# Patient Record
Sex: MALE | Race: WHITE | Employment: OTHER | URBAN - METROPOLITAN AREA
[De-identification: names, ages, dates, MRNs, and addresses within clinical notes are randomized per-mention and may not be internally consistent; named-entity substitution may affect disease eponyms.]

---

## 2017-02-16 PROBLEM — E11.8 CONTROLLED TYPE 2 DIABETES MELLITUS WITH COMPLICATION, WITHOUT LONG-TERM CURRENT USE OF INSULIN (HCC): Status: ACTIVE | Noted: 2017-02-16

## 2017-07-06 PROCEDURE — 82570 ASSAY OF URINE CREATININE: CPT | Performed by: INTERNAL MEDICINE

## 2017-07-06 PROCEDURE — 82043 UR ALBUMIN QUANTITATIVE: CPT | Performed by: INTERNAL MEDICINE

## 2018-01-01 PROCEDURE — 82043 UR ALBUMIN QUANTITATIVE: CPT | Performed by: INTERNAL MEDICINE

## 2018-01-01 PROCEDURE — 82570 ASSAY OF URINE CREATININE: CPT | Performed by: INTERNAL MEDICINE

## 2018-04-30 PROBLEM — F33.42 RECURRENT MAJOR DEPRESSIVE DISORDER, IN FULL REMISSION (HCC): Status: ACTIVE | Noted: 2018-01-01

## 2018-12-10 PROBLEM — I48.19 PERSISTENT ATRIAL FIBRILLATION (HCC): Status: ACTIVE | Noted: 2018-01-01

## 2019-01-01 ENCOUNTER — HOSPITAL ENCOUNTER (INPATIENT)
Facility: HOSPITAL | Age: 84
LOS: 4 days | Discharge: NURSING, REHAB OR LONG-TERM CARE | DRG: 689 | End: 2019-01-01
Attending: EMERGENCY MEDICINE | Admitting: HOSPITALIST
Payer: MEDICARE

## 2019-01-01 ENCOUNTER — APPOINTMENT (OUTPATIENT)
Dept: GENERAL RADIOLOGY | Facility: HOSPITAL | Age: 84
DRG: 689 | End: 2019-01-01
Attending: EMERGENCY MEDICINE
Payer: MEDICARE

## 2019-01-01 ENCOUNTER — HOSPITAL ENCOUNTER (EMERGENCY)
Facility: HOSPITAL | Age: 84
Discharge: HOME | End: 2019-01-01
Attending: EMERGENCY MEDICINE
Payer: MEDICARE

## 2019-01-01 VITALS
WEIGHT: 149.94 LBS | DIASTOLIC BLOOD PRESSURE: 72 MMHG | RESPIRATION RATE: 18 BRPM | TEMPERATURE: 98 F | SYSTOLIC BLOOD PRESSURE: 135 MMHG | OXYGEN SATURATION: 94 % | HEART RATE: 80 BPM | BODY MASS INDEX: 20 KG/M2

## 2019-01-01 VITALS
DIASTOLIC BLOOD PRESSURE: 91 MMHG | BODY MASS INDEX: 20.32 KG/M2 | HEART RATE: 74 BPM | OXYGEN SATURATION: 96 % | SYSTOLIC BLOOD PRESSURE: 159 MMHG | RESPIRATION RATE: 18 BRPM | TEMPERATURE: 98 F | HEIGHT: 72 IN | WEIGHT: 150 LBS

## 2019-01-01 DIAGNOSIS — R19.5 HEME POSITIVE STOOL: ICD-10-CM

## 2019-01-01 DIAGNOSIS — R53.1 WEAKNESS: Primary | ICD-10-CM

## 2019-01-01 DIAGNOSIS — R31.9 HEMATURIA, UNSPECIFIED TYPE: Primary | ICD-10-CM

## 2019-01-01 DIAGNOSIS — M48.062 SPINAL STENOSIS OF LUMBAR REGION WITH NEUROGENIC CLAUDICATION: ICD-10-CM

## 2019-01-01 DIAGNOSIS — N39.0 URINARY TRACT INFECTION WITHOUT HEMATURIA, SITE UNSPECIFIED: ICD-10-CM

## 2019-01-01 LAB
ANION GAP SERPL CALC-SCNC: 10 MMOL/L (ref 0–18)
ANION GAP SERPL CALC-SCNC: 11 MMOL/L (ref 0–18)
ANION GAP SERPL CALC-SCNC: 12 MMOL/L (ref 0–18)
ANION GAP SERPL CALC-SCNC: 9 MMOL/L (ref 0–18)
ANION GAP SERPL CALC-SCNC: 9 MMOL/L (ref 0–18)
BASOPHILS # BLD: 0 K/UL (ref 0–0.2)
BASOPHILS # BLD: 0.1 K/UL (ref 0–0.2)
BASOPHILS NFR BLD: 0 %
BASOPHILS NFR BLD: 1 %
BASOPHILS NFR BLD: 1 %
BILIRUB UR QL: NEGATIVE
BILIRUB UR QL: NEGATIVE
BUN SERPL-MCNC: 11 MG/DL (ref 8–20)
BUN SERPL-MCNC: 15 MG/DL (ref 8–20)
BUN SERPL-MCNC: 18 MG/DL (ref 8–20)
BUN SERPL-MCNC: 18 MG/DL (ref 8–20)
BUN SERPL-MCNC: 9 MG/DL (ref 8–20)
BUN/CREAT SERPL: 11 (ref 10–20)
BUN/CREAT SERPL: 12.9 (ref 10–20)
BUN/CREAT SERPL: 18.3 (ref 10–20)
BUN/CREAT SERPL: 19.6 (ref 10–20)
BUN/CREAT SERPL: 20.5 (ref 10–20)
CALCIUM SERPL-MCNC: 10 MG/DL (ref 8.5–10.5)
CALCIUM SERPL-MCNC: 10.1 MG/DL (ref 8.5–10.5)
CALCIUM SERPL-MCNC: 10.3 MG/DL (ref 8.5–10.5)
CALCIUM SERPL-MCNC: 9.6 MG/DL (ref 8.5–10.5)
CALCIUM SERPL-MCNC: 9.8 MG/DL (ref 8.5–10.5)
CHLORIDE SERPL-SCNC: 102 MMOL/L (ref 95–110)
CHLORIDE SERPL-SCNC: 102 MMOL/L (ref 95–110)
CHLORIDE SERPL-SCNC: 103 MMOL/L (ref 95–110)
CHLORIDE SERPL-SCNC: 103 MMOL/L (ref 95–110)
CHLORIDE SERPL-SCNC: 97 MMOL/L (ref 95–110)
CO2 SERPL-SCNC: 23 MMOL/L (ref 22–32)
CO2 SERPL-SCNC: 24 MMOL/L (ref 22–32)
CO2 SERPL-SCNC: 25 MMOL/L (ref 22–32)
COLOR UR: YELLOW
CREAT SERPL-MCNC: 0.82 MG/DL (ref 0.5–1.5)
CREAT SERPL-MCNC: 0.82 MG/DL (ref 0.5–1.5)
CREAT SERPL-MCNC: 0.85 MG/DL (ref 0.5–1.5)
CREAT SERPL-MCNC: 0.88 MG/DL (ref 0.5–1.5)
CREAT SERPL-MCNC: 0.92 MG/DL (ref 0.5–1.5)
EOSINOPHIL # BLD: 0 K/UL (ref 0–0.7)
EOSINOPHIL # BLD: 0.1 K/UL (ref 0–0.7)
EOSINOPHIL # BLD: 0.1 K/UL (ref 0–0.7)
EOSINOPHIL NFR BLD: 0 %
EOSINOPHIL NFR BLD: 1 %
EOSINOPHIL NFR BLD: 1 %
ERYTHROCYTE [DISTWIDTH] IN BLOOD BY AUTOMATED COUNT: 16.6 % (ref 11–15)
ERYTHROCYTE [DISTWIDTH] IN BLOOD BY AUTOMATED COUNT: 17 % (ref 11–15)
ERYTHROCYTE [DISTWIDTH] IN BLOOD BY AUTOMATED COUNT: 17 % (ref 11–15)
ERYTHROCYTE [DISTWIDTH] IN BLOOD BY AUTOMATED COUNT: 17.1 % (ref 11–15)
ERYTHROCYTE [DISTWIDTH] IN BLOOD BY AUTOMATED COUNT: 17.4 % (ref 11–15)
GLUCOSE BLDC GLUCOMTR-MCNC: 118 MG/DL (ref 70–99)
GLUCOSE SERPL-MCNC: 110 MG/DL (ref 70–99)
GLUCOSE SERPL-MCNC: 113 MG/DL (ref 70–99)
GLUCOSE SERPL-MCNC: 118 MG/DL (ref 70–99)
GLUCOSE SERPL-MCNC: 121 MG/DL (ref 70–99)
GLUCOSE SERPL-MCNC: 124 MG/DL (ref 70–99)
GLUCOSE UR-MCNC: NEGATIVE MG/DL
GLUCOSE UR-MCNC: NEGATIVE MG/DL
HCT VFR BLD AUTO: 28.7 % (ref 41–52)
HCT VFR BLD AUTO: 29.9 % (ref 41–52)
HCT VFR BLD AUTO: 30.9 % (ref 41–52)
HCT VFR BLD AUTO: 31.7 % (ref 41–52)
HCT VFR BLD AUTO: 32.8 % (ref 41–52)
HGB BLD-MCNC: 10.3 G/DL (ref 13.5–17.5)
HGB BLD-MCNC: 9.1 G/DL (ref 13.5–17.5)
HGB BLD-MCNC: 9.2 G/DL (ref 13.5–17.5)
HGB BLD-MCNC: 9.7 G/DL (ref 13.5–17.5)
HGB BLD-MCNC: 9.8 G/DL (ref 13.5–17.5)
KETONES UR-MCNC: NEGATIVE MG/DL
KETONES UR-MCNC: NEGATIVE MG/DL
LEUKOCYTE ESTERASE UR QL STRIP.AUTO: NEGATIVE
LYMPHOCYTES # BLD: 0.6 K/UL (ref 1–4)
LYMPHOCYTES # BLD: 0.7 K/UL (ref 1–4)
LYMPHOCYTES # BLD: 0.7 K/UL (ref 1–4)
LYMPHOCYTES # BLD: 0.8 K/UL (ref 1–4)
LYMPHOCYTES # BLD: 1.1 K/UL (ref 1–4)
LYMPHOCYTES NFR BLD: 12 %
LYMPHOCYTES NFR BLD: 5 %
LYMPHOCYTES NFR BLD: 5 %
LYMPHOCYTES NFR BLD: 8 %
LYMPHOCYTES NFR BLD: 8 %
MAGNESIUM SERPL-MCNC: 1.9 MG/DL (ref 1.8–2.5)
MCH RBC QN AUTO: 24.9 PG (ref 27–32)
MCH RBC QN AUTO: 25.2 PG (ref 27–32)
MCH RBC QN AUTO: 25.3 PG (ref 27–32)
MCH RBC QN AUTO: 25.4 PG (ref 27–32)
MCH RBC QN AUTO: 25.4 PG (ref 27–32)
MCHC RBC AUTO-ENTMCNC: 30.7 G/DL (ref 32–37)
MCHC RBC AUTO-ENTMCNC: 31 G/DL (ref 32–37)
MCHC RBC AUTO-ENTMCNC: 31.3 G/DL (ref 32–37)
MCHC RBC AUTO-ENTMCNC: 31.5 G/DL (ref 32–37)
MCHC RBC AUTO-ENTMCNC: 31.6 G/DL (ref 32–37)
MCV RBC AUTO: 80.4 FL (ref 80–100)
MCV RBC AUTO: 80.5 FL (ref 80–100)
MCV RBC AUTO: 80.6 FL (ref 80–100)
MCV RBC AUTO: 81 FL (ref 80–100)
MCV RBC AUTO: 81.7 FL (ref 80–100)
MONOCYTES # BLD: 0.8 K/UL (ref 0–1)
MONOCYTES # BLD: 0.9 K/UL (ref 0–1)
MONOCYTES # BLD: 0.9 K/UL (ref 0–1)
MONOCYTES # BLD: 1 K/UL (ref 0–1)
MONOCYTES # BLD: 1.1 K/UL (ref 0–1)
MONOCYTES NFR BLD: 10 %
MONOCYTES NFR BLD: 10 %
MONOCYTES NFR BLD: 6 %
MONOCYTES NFR BLD: 7 %
MONOCYTES NFR BLD: 9 %
NEUTROPHILS # BLD AUTO: 10.3 K/UL (ref 1.8–7.7)
NEUTROPHILS # BLD AUTO: 13 K/UL (ref 1.8–7.7)
NEUTROPHILS # BLD AUTO: 7 K/UL (ref 1.8–7.7)
NEUTROPHILS # BLD AUTO: 7.6 K/UL (ref 1.8–7.7)
NEUTROPHILS # BLD AUTO: 8.8 K/UL (ref 1.8–7.7)
NEUTROPHILS NFR BLD: 77 %
NEUTROPHILS NFR BLD: 81 %
NEUTROPHILS NFR BLD: 81 %
NEUTROPHILS NFR BLD: 87 %
NEUTROPHILS NFR BLD: 89 %
NITRITE UR QL STRIP.AUTO: NEGATIVE
NITRITE UR QL STRIP.AUTO: NEGATIVE
OSMOLALITY UR CALC.SUM OF ELEC: 278 MOSM/KG (ref 275–295)
OSMOLALITY UR CALC.SUM OF ELEC: 278 MOSM/KG (ref 275–295)
OSMOLALITY UR CALC.SUM OF ELEC: 282 MOSM/KG (ref 275–295)
OSMOLALITY UR CALC.SUM OF ELEC: 283 MOSM/KG (ref 275–295)
OSMOLALITY UR CALC.SUM OF ELEC: 289 MOSM/KG (ref 275–295)
PH UR: 5 [PH] (ref 5–8)
PH UR: 5 [PH] (ref 5–8)
PLATELET # BLD AUTO: 164 K/UL (ref 140–400)
PLATELET # BLD AUTO: 166 K/UL (ref 140–400)
PLATELET # BLD AUTO: 177 K/UL (ref 140–400)
PLATELET # BLD AUTO: 198 K/UL (ref 140–400)
PLATELET # BLD AUTO: 213 K/UL (ref 140–400)
PMV BLD AUTO: 7.7 FL (ref 7.4–10.3)
PMV BLD AUTO: 8 FL (ref 7.4–10.3)
PMV BLD AUTO: 8.4 FL (ref 7.4–10.3)
PMV BLD AUTO: 8.8 FL (ref 7.4–10.3)
PMV BLD AUTO: 9 FL (ref 7.4–10.3)
POTASSIUM SERPL-SCNC: 3.8 MMOL/L (ref 3.3–5.1)
POTASSIUM SERPL-SCNC: 3.8 MMOL/L (ref 3.3–5.1)
POTASSIUM SERPL-SCNC: 3.9 MMOL/L (ref 3.3–5.1)
POTASSIUM SERPL-SCNC: 4 MMOL/L (ref 3.3–5.1)
POTASSIUM SERPL-SCNC: 4.2 MMOL/L (ref 3.3–5.1)
POTASSIUM SERPL-SCNC: 4.2 MMOL/L (ref 3.3–5.1)
PROT UR-MCNC: 100 MG/DL
PROT UR-MCNC: 100 MG/DL
RBC # BLD AUTO: 3.56 M/UL (ref 4.5–5.9)
RBC # BLD AUTO: 3.66 M/UL (ref 4.5–5.9)
RBC # BLD AUTO: 3.85 M/UL (ref 4.5–5.9)
RBC # BLD AUTO: 3.91 M/UL (ref 4.5–5.9)
RBC # BLD AUTO: 4.07 M/UL (ref 4.5–5.9)
RBC #/AREA URNS AUTO: 490 /HPF
RBC #/AREA URNS AUTO: 6300 /HPF
SODIUM SERPL-SCNC: 133 MMOL/L (ref 136–144)
SODIUM SERPL-SCNC: 134 MMOL/L (ref 136–144)
SODIUM SERPL-SCNC: 135 MMOL/L (ref 136–144)
SODIUM SERPL-SCNC: 136 MMOL/L (ref 136–144)
SODIUM SERPL-SCNC: 138 MMOL/L (ref 136–144)
SP GR UR STRIP: 1.02 (ref 1–1.03)
SP GR UR STRIP: 1.02 (ref 1–1.03)
UROBILINOGEN UR STRIP-ACNC: <2
UROBILINOGEN UR STRIP-ACNC: <2
VIT C UR-MCNC: 20 MG/DL
VIT C UR-MCNC: NEGATIVE MG/DL
WBC # BLD AUTO: 10.9 K/UL (ref 4–11)
WBC # BLD AUTO: 11.8 K/UL (ref 4–11)
WBC # BLD AUTO: 14.6 K/UL (ref 4–11)
WBC # BLD AUTO: 9.1 K/UL (ref 4–11)
WBC # BLD AUTO: 9.5 K/UL (ref 4–11)
WBC #/AREA URNS AUTO: 1981 /HPF
WBC #/AREA URNS AUTO: 5 /HPF

## 2019-01-01 PROCEDURE — 99231 SBSQ HOSP IP/OBS SF/LOW 25: CPT | Performed by: REGISTERED NURSE

## 2019-01-01 PROCEDURE — 99232 SBSQ HOSP IP/OBS MODERATE 35: CPT | Performed by: REGISTERED NURSE

## 2019-01-01 PROCEDURE — 93010 ELECTROCARDIOGRAM REPORT: CPT | Performed by: EMERGENCY MEDICINE

## 2019-01-01 PROCEDURE — 99284 EMERGENCY DEPT VISIT MOD MDM: CPT

## 2019-01-01 PROCEDURE — 81001 URINALYSIS AUTO W/SCOPE: CPT | Performed by: EMERGENCY MEDICINE

## 2019-01-01 PROCEDURE — 94640 AIRWAY INHALATION TREATMENT: CPT

## 2019-01-01 PROCEDURE — 71045 X-RAY EXAM CHEST 1 VIEW: CPT | Performed by: EMERGENCY MEDICINE

## 2019-01-01 PROCEDURE — 99223 1ST HOSP IP/OBS HIGH 75: CPT | Performed by: REGISTERED NURSE

## 2019-01-01 PROCEDURE — 93005 ELECTROCARDIOGRAM TRACING: CPT

## 2019-01-01 RX ORDER — ONDANSETRON 2 MG/ML
4 INJECTION INTRAMUSCULAR; INTRAVENOUS EVERY 6 HOURS PRN
Status: DISCONTINUED | OUTPATIENT
Start: 2019-01-01 | End: 2019-01-01

## 2019-01-01 RX ORDER — HALOPERIDOL 5 MG/ML
0.5 INJECTION INTRAMUSCULAR EVERY 6 HOURS PRN
Status: DISCONTINUED | OUTPATIENT
Start: 2019-01-01 | End: 2019-01-01

## 2019-01-01 RX ORDER — METOCLOPRAMIDE HYDROCHLORIDE 5 MG/ML
10 INJECTION INTRAMUSCULAR; INTRAVENOUS EVERY 8 HOURS PRN
Status: DISCONTINUED | OUTPATIENT
Start: 2019-01-01 | End: 2019-01-01

## 2019-01-01 RX ORDER — FENTANYL 12 UG/H
1 PATCH TRANSDERMAL
Qty: 1 PATCH | Refills: 0 | Status: SHIPPED | OUTPATIENT
Start: 2019-01-01 | End: 2019-01-01

## 2019-01-01 RX ORDER — CEFUROXIME AXETIL 500 MG/1
500 TABLET ORAL 2 TIMES DAILY
Qty: 28 TABLET | Refills: 0 | Status: SHIPPED | OUTPATIENT
Start: 2019-01-01 | End: 2019-01-01

## 2019-01-01 RX ORDER — LORAZEPAM 1 MG/1
1 TABLET ORAL 2 TIMES DAILY PRN
Status: DISCONTINUED | OUTPATIENT
Start: 2019-01-01 | End: 2019-01-01

## 2019-01-01 RX ORDER — SODIUM CHLORIDE 0.9 % (FLUSH) 0.9 %
3 SYRINGE (ML) INJECTION AS NEEDED
Status: DISCONTINUED | OUTPATIENT
Start: 2019-01-01 | End: 2019-01-01

## 2019-01-01 RX ORDER — ALBUTEROL SULFATE 90 UG/1
2 AEROSOL, METERED RESPIRATORY (INHALATION) EVERY 4 HOURS PRN
Status: DISCONTINUED | OUTPATIENT
Start: 2019-01-01 | End: 2019-01-01

## 2019-01-01 RX ORDER — ALFUZOSIN HYDROCHLORIDE 10 MG/1
10 TABLET, EXTENDED RELEASE ORAL
Status: DISCONTINUED | OUTPATIENT
Start: 2019-01-01 | End: 2019-01-01

## 2019-01-01 RX ORDER — BISACODYL 10 MG
10 SUPPOSITORY, RECTAL RECTAL
Status: DISCONTINUED | OUTPATIENT
Start: 2019-01-01 | End: 2019-01-01

## 2019-01-01 RX ORDER — ACETAMINOPHEN 500 MG
500 TABLET ORAL EVERY 8 HOURS PRN
Status: DISCONTINUED | OUTPATIENT
Start: 2019-01-01 | End: 2019-01-01

## 2019-01-01 RX ORDER — CIPROFLOXACIN 500 MG/1
500 TABLET, FILM COATED ORAL 2 TIMES DAILY
Qty: 14 TABLET | Refills: 0 | Status: SHIPPED | OUTPATIENT
Start: 2019-01-01 | End: 2019-01-01

## 2019-01-01 RX ORDER — DOCUSATE SODIUM 100 MG/1
100 CAPSULE, LIQUID FILLED ORAL 2 TIMES DAILY
Status: DISCONTINUED | OUTPATIENT
Start: 2019-01-01 | End: 2019-01-01

## 2019-01-01 RX ORDER — HALOPERIDOL 1 MG/1
0.5 TABLET ORAL EVERY 6 HOURS PRN
Status: DISCONTINUED | OUTPATIENT
Start: 2019-01-01 | End: 2019-01-01

## 2019-01-01 RX ORDER — SODIUM PHOSPHATE, DIBASIC AND SODIUM PHOSPHATE, MONOBASIC 7; 19 G/133ML; G/133ML
1 ENEMA RECTAL ONCE AS NEEDED
Status: DISCONTINUED | OUTPATIENT
Start: 2019-01-01 | End: 2019-01-01

## 2019-01-01 RX ORDER — POTASSIUM CHLORIDE 20 MEQ/1
40 TABLET, EXTENDED RELEASE ORAL ONCE
Status: COMPLETED | OUTPATIENT
Start: 2019-01-01 | End: 2019-01-01

## 2019-01-01 RX ORDER — DULOXETIN HYDROCHLORIDE 30 MG/1
90 CAPSULE, DELAYED RELEASE ORAL DAILY
Status: DISCONTINUED | OUTPATIENT
Start: 2019-01-01 | End: 2019-01-01

## 2019-01-01 RX ORDER — PANTOPRAZOLE SODIUM 40 MG/1
40 TABLET, DELAYED RELEASE ORAL
Status: DISCONTINUED | OUTPATIENT
Start: 2019-01-01 | End: 2019-01-01

## 2019-01-01 RX ORDER — SODIUM CHLORIDE 9 MG/ML
INJECTION, SOLUTION INTRAVENOUS
Status: COMPLETED
Start: 2019-01-01 | End: 2019-01-01

## 2019-01-01 RX ORDER — POLYETHYLENE GLYCOL 3350 17 G/17G
17 POWDER, FOR SOLUTION ORAL DAILY PRN
Status: DISCONTINUED | OUTPATIENT
Start: 2019-01-01 | End: 2019-01-01

## 2019-01-01 RX ORDER — TRAZODONE HYDROCHLORIDE 50 MG/1
50 TABLET ORAL NIGHTLY
Status: DISCONTINUED | OUTPATIENT
Start: 2019-01-01 | End: 2019-01-01

## 2019-01-01 RX ORDER — ALBUTEROL SULFATE 2.5 MG/3ML
2.5 SOLUTION RESPIRATORY (INHALATION) ONCE
Status: COMPLETED | OUTPATIENT
Start: 2019-01-01 | End: 2019-01-01

## 2019-01-01 RX ORDER — LISINOPRIL 20 MG/1
20 TABLET ORAL DAILY
Status: DISCONTINUED | OUTPATIENT
Start: 2019-01-01 | End: 2019-01-01

## 2019-01-01 RX ORDER — FENTANYL 12 UG/H
1 PATCH TRANSDERMAL
Status: DISCONTINUED | OUTPATIENT
Start: 2019-01-01 | End: 2019-01-01

## 2019-01-01 RX ORDER — HYDROCODONE BITARTRATE AND ACETAMINOPHEN 5; 325 MG/1; MG/1
1 TABLET ORAL EVERY 6 HOURS PRN
Status: DISCONTINUED | OUTPATIENT
Start: 2019-01-01 | End: 2019-01-01

## 2019-01-01 RX ORDER — LORAZEPAM 1 MG/1
1 TABLET ORAL 2 TIMES DAILY PRN
Status: ON HOLD | COMMUNITY
End: 2019-01-01

## 2019-01-01 RX ORDER — POTASSIUM CHLORIDE 14.9 MG/ML
20 INJECTION INTRAVENOUS ONCE
Status: COMPLETED | OUTPATIENT
Start: 2019-01-01 | End: 2019-01-01

## 2019-01-01 RX ORDER — COLESEVELAM 180 1/1
1875 TABLET ORAL 2 TIMES DAILY WITH MEALS
Status: DISCONTINUED | OUTPATIENT
Start: 2019-01-01 | End: 2019-01-01

## 2019-01-01 RX ORDER — LORAZEPAM 1 MG/1
1 TABLET ORAL 2 TIMES DAILY PRN
Qty: 5 TABLET | Refills: 0 | Status: SHIPPED | OUTPATIENT
Start: 2019-01-01

## 2019-01-01 RX ORDER — ACETAMINOPHEN 500 MG
500 TABLET ORAL EVERY 8 HOURS PRN
COMMUNITY

## 2019-01-02 NOTE — ED INITIAL ASSESSMENT (HPI)
Arrived from [facility] for c/o hematuria today. Denies fever or nausea. Denies abdominal discomfort or constipation. No c/o dysuria, frequency or urgency.

## 2019-01-02 NOTE — ED PROVIDER NOTES
Patient Seen in: [facility] AND [facility] Emergency Department    History   Patient presents with:  Urinary Symptoms (urologic)    Stated Complaint:     HPI    69-year-old male presents for evaluation of hematuria.   Patient sent from nursing home, he reports fo Performed by Nelly Fitzpatrick MD at [address] N/A 12/20/2012    Performed by Paulina Perea MD at . [facility] 139 N/A 4/24/2013    Perfo Skin is warm and dry. No rash noted. Psychiatric: He has a normal mood and affect. Nursing note and vitals reviewed.             ED Course     Labs Reviewed   URINALYSIS WITH CULTURE REFLEX - Abnormal; Notable for the following components:       Result mouth 2 (two) times daily for 7 days.   Qty: 14 tablet Refills: 0

## 2019-01-02 NOTE — ED NOTES
Preparing for return to [facility] - daughter would like to drive him. Pt is due for his bronchodilators - audible wheezing noted but pt is conversational and has no c/o dyspnea. Plan is for RT to initiate albuterol neb prior to discharge.  To be discharge

## 2019-01-02 NOTE — ED NOTES
Spoke with daughter- states pt was not acting like himself yesterday and had an episode of urinary incontinence which is not normal for him. Hx of recurrent UTIs, \"but it has been over 2 years since the last one. \"

## 2019-01-02 NOTE — ED NOTES
Arabella Chan is requesting some water, then he will try to provide sample for UA.  Family at bedside (brother, sister-in-law, and daughter)

## 2019-01-21 PROBLEM — R53.1 WEAKNESS: Status: ACTIVE | Noted: 2019-01-01

## 2019-01-21 PROBLEM — N39.0 URINARY TRACT INFECTION WITHOUT HEMATURIA, SITE UNSPECIFIED: Status: ACTIVE | Noted: 2019-01-01

## 2019-01-21 PROBLEM — R19.5 HEME POSITIVE STOOL: Status: ACTIVE | Noted: 2019-01-01

## 2019-01-21 NOTE — CONSULTS
[facility] HOSP - [facility]    Report of Consultation    Loletha Dates Patient Status:  Inpatient    3/21/1931 MRN [id number]   Location [facility] 4W/SW/SE Attending Saran Jesus MD   Hosp Day # 0 PCP Martha Chakraborty MD     Date of Admissi Surgical History:   Procedure Laterality Date   • BYPASS SURGERY  2006   • CABG      7/19/06   • HERNIA SURGERY      left inguinal   • LUMBAR EPIDURAL N/A 11/7/2012    Performed by Paulina Perea MD at [address] [place] [address] Q4H PRN   TraZODone HCl (DESYREL) tab 50 mg 50 mg Oral Nightly   [START ON 1/22/2019] Alfuzosin HCl ER (UROXATRAL) 24 hr tab 10 mg 10 mg Oral Daily with breakfast   LORazepam (ATIVAN) tab 1 mg 1 mg Oral BID PRN   Umeclidinium Bromide (INCRUSE ELLIPTA) 62. 5 DAY)   Calcium Polycarbophil 625 MG Oral Tab Take 1 tablet (625 mg total) by mouth daily. DULoxetine HCl 30 MG Oral Cap DR Particles Take 90 mg by mouth daily. lisinopril 20 MG Oral Tab Take 20 mg by mouth daily.      Alpha-Lipoic Acid 600 MG Oral Cap cachexia      Results:     Laboratory Data:  Lab Results   Component Value Date    WBC 14.6 (H) 01/21/2019    HGB 9.7 (L) 01/21/2019    HCT 31.7 (L) 01/21/2019     01/21/2019    CREATSERUM 0.92 01/21/2019    BUN 18 01/21/2019     01/21/2019 techniques for patient specific dose reduction were followed while maintaining the necessary diagnostic image quality. ADVERSE REACTION: None.  FINDINGS:  KIDNEYS: The noncontrast appearance of the kidneys demonstrates L2-3 millimeter stone in the posterior extrahepatic biliary ductal dilatation. PANCREAS: The pancreas is grossly normal in size and homogeneous. There is no pancreatic ductal dilatation. SPLEEN: The spleen is grossly normal in size and homogeneous without focal solid or cystic lesions.  ADRENAL both urinary tract infection and anemia with Hemoccult positive stools. True hemoglobin likely lower due to hemoconcentration. Recommendations:  I had a discussion with patient's daughter about his anemia and Hemoccult-positive stools.   We discussed di

## 2019-01-21 NOTE — H&P
[address]    History & Physical (or consult)    Angelica Brewster Patient Status:  Emergency    3/21/1931 MRN [id number]   Location [address] Attending Claire Plata MD   Hosp Day # 0 PCP Da • HYPERLIPIDEMIA    • HYPERTENSION    • OTHER DISEASES     prediabetes   • OTHER DISEASES     Vit D Deficiency   • OTHER DISEASES     hypercalciurua   • PERIPHERAL VASCULAR DISEASE     AAA repair    • Unspecified sleep apnea PSG 3-30-13    AHI 30 REM AHI 136/69, pulse 95, temperature 98.7 °F (37.1 °C), temperature source Oral, resp. rate 17, weight 149 lb 14.6 oz (68 kg), SpO2 96 %.      gen - nad, comfortable, appears stated age  heent -  moist mucous membranes, neck is supple/no thyromegaly, oropharynx cl on 1/21/2019 at 13:31          Ekg 12-lead    Result Date: 1/21/2019  ECG Report  Interpretation  -------------------------- atrial fibrillation - frequent pvc's -Right sided conduction defect and right axis -possible right ventricular hypertrophy or poste stool noted in ER. Continue PPI.   Stop eliquis  -daughter would prefer conservative care if no sig drop in Hgb or large bloody show    #pulm  -patient was to see pulmonary as outpatient for preop clearance  -will consult pulmonary prn if operation indicat

## 2019-01-21 NOTE — ED PROVIDER NOTES
Patient Seen in: [facility] AND [facility] Emergency Department    History   Patient presents with:  Fatigue (constitutional, neurologic)    Stated Complaint: weakness    HPI    Patient is an 28-year-old male with a history of dementia his daughter brought him t PAIN MANAGEMENT   • LUMBAR RADIOFREQUENCY N/A 4/24/2013    Performed by Concha Hernandez MD at [address]   • OTHER SURGICAL HISTORY  7/27/10    Flow u/s   • OTHER SURGICAL HISTORY  7/2/14    Cysto-Stent Removal-Dr Aurelia Parker   • SPINAL FUSIO Lymphadenopathy: No cervical adenopathy. Neurological: Alert and oriented to person, place, Normal reflexes. No cranial nerve deficit. No motor os sensory defecits noted Coordination normal.   Skin: Skin is warm and dry.    Psychiatric: Normal mood and ROUTINE   BLOOD CULTURE   BLOOD CULTURE     EKG    Rate, intervals and axes as noted on EKG Report.   Rate: 97  Rhythm: Atrial Fibrillation  Reading: Atrial fibrillation no acute ST-T wave changes              Xr Chest Ap Portable  (cpt=71045)    Result Jaret ICD-10-CM Noted POA    Weakness R53.1 1/21/2019 Unknown

## 2019-01-21 NOTE — PLAN OF CARE
Patient/Family Goals    • Patient/Family Long Term Goal Progressing    • Patient/Family Short Term Goal Progressing          Pt alert to self. Received from ED. Pt brought to ED via daughter from [facility]. +UTI, +OB, + hematuria. Incontinent.  Rocephin g

## 2019-01-22 PROBLEM — G89.29 CHRONIC PAIN: Status: ACTIVE | Noted: 2019-01-01

## 2019-01-22 PROBLEM — Z71.89 GOALS OF CARE, COUNSELING/DISCUSSION: Status: ACTIVE | Noted: 2019-01-01

## 2019-01-22 PROBLEM — Z71.89 ADVANCE CARE PLANNING: Status: ACTIVE | Noted: 2019-01-01

## 2019-01-22 NOTE — CONSULTS
[facility] AND [facility] Infectious Disease  Report of Consultation    [facility] Patient Status:  Inpatient    3/21/1931 MRN [id number]   Location [facility] 4W/SW/SE Attending Jill Riley MD   Hosp Day # 1 PCP Shalom Colby MD MANAGEMENT   • LUMBAR EPIDURAL N/A 9/25/2012    Performed by Simin Steinberg MD at [address] Bilateral 1/10/2013    Performed by Spring Neves MD at [address] [place] [address] Daily with breakfast  •  LORazepam (ATIVAN) tab 1 mg, 1 mg, Oral, BID PRN  •  Umeclidinium Bromide (INCRUSE ELLIPTA) 62.5 MCG/INH inhaler 1 puff, 1 puff, Inhalation, Daily  •  lisinopril (PRINIVIL,ZESTRIL) tab 20 mg, 20 mg, Oral, Daily  •  Normal Saline Fl Temp Temp src Pulse Resp SpO2   01/22/19 1355 116/48 97.8 °F (36.6 °C) Oral 95 16 95 %   01/22/19 0418 149/82 97.2 °F (36.2 °C) Oral 99 18 94 %   01/21/19 2034 140/82 97.9 °F (36.6 °C) Oral 55 16 97 %   01/21/19 1900 — — — 98 — —   01/21/19 1824 — — — 94 — 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 The appearance is stable. 7. Degenerative disc disease. 8. Cardiomegaly. Assessment and Plan:    1.   Acute gram negative sepsis - 2/2 blood cultures positive for GNRs - suspect e.coli from  source  - Urine

## 2019-01-22 NOTE — CONSULTS
[facility]    Report of Consultation    Viry Steinberg Patient Status:  Inpatient    3/21/1931 MRN [id number]   Location [facility] 4W/SW/SE Attending Joan Benton MD   Hosp Day # 1 PCP Jose Castro MD     Reason for Cons Procedure Laterality Date   • BYPASS SURGERY  2006   • CABG      7/19/06   • HERNIA SURGERY      left inguinal   • LUMBAR EPIDURAL N/A 11/7/2012    Performed by Tila Chavira MD at [address] [place] [address] N/A 10/23/2012 tablet, 1 tablet, Oral, Q6H PRN  •  Pantoprazole Sodium (PROTONIX) EC tab 40 mg, 40 mg, Oral, QAM AC  •  Albuterol Sulfate  (90 Base) MCG/ACT inhaler 2 puff, 2 puff, Inhalation, Q4H PRN  •  TraZODone HCl (DESYREL) tab 50 mg, 50 mg, Oral, Nightly  • alert, appears stated age, cooperative, no distress, resting in chair bedside  Head: Normocephalic, without obvious abnormality, atraumatic  Eyes: conjunctivae and sclerae normal  Ears: hard of hearing  Lungs: symmetrical chest wall expansion, non-labored

## 2019-01-22 NOTE — CONSULTS
[address] Patient Status:  Inpatient    3/21/1931 MRN [id number]   Location [facility] 4W/SW/SE Attending Gene Salguero MD   [facility] Day # 1 PCP Enoc Shoemaker MD     Date of dtr pt has chronic back pain which has been managed with Fentanyl patch 12 mcg. Pt has not taken any Norco-5/325 q6hr PRN. Pt per pt's dtr, Jory Oleary, pt's appetite has been reduced over the past few weeks, current BMI is 20, weight is 149 pounds.  Patient has discussed the normal disease trajectory of alzheimer's Dementia/possible bladder cancer/COPD with associated symptoms and decline over time.  Pt's dtr tells me that pt was seen by Palliative care at [address] [place] [address] in 2016 and was discharged to [facility] with Karri Jackson • OTHER DISEASES     prediabetes   • OTHER DISEASES     Vit D Deficiency   • OTHER DISEASES     hypercalciurua   • PERIPHERAL VASCULAR DISEASE     AAA repair    • Unspecified sleep apnea PSG 3-30-13    AHI 30 REM AHI 74 [address] 78% CPAP 14     Past S 100 mL ADD-vantage, 3.375 g, Intravenous, Q8H  •  acetaminophen (TYLENOL EXTRA STRENGTH) tab 500 mg, 500 mg, Oral, Q8H PRN  •  Fluticasone Furoate-Vilanterol (BREO ELLIPTA) 200-25 MCG/INH inhaler 1 puff, 1 puff, Inhalation, Daily  •  Colesevelam HCl [facility] WBC 11.8 (H) 01/22/2019    HGB 9.2 (L) 01/22/2019    HCT 29.9 (L) 01/22/2019     01/22/2019       Coags:No results found for: PT, INR, PTT    Chemistry:  Lab Results   Component Value Date    CREATSERUM 0.88 01/22/2019    BUN 18 01/22/2019    NA pt's dtr  Code status: DNR  Have advanced directives been discussed with patient or healthcare power of : Yes        Healthcare Agent Appointed: Yes  Healthcare Agent's Name: Miguel Santizo (dtr)  Healthcare Agent's Phone Number: [phone] visit with Dr. Karon Dooley and [facility] RN    I will continue to follow clinically.     Thank you for allowing Palliative Care services to participate in the care of . Lynn Ritu, [facility] [id number]  1/22/2019  12:05 PM

## 2019-01-22 NOTE — RESPIRATORY THERAPY NOTE
YARIEL ASSESSMENT:    Pt does not have a previous diagnosis of YARIEL. Pt does not routinely use a CPAP device at home. This pt is not suspected to be at high risk for YARIEL and sleep lab packet was not provided to patient for outpatient follow-up. Patient refused

## 2019-01-22 NOTE — CM/SW NOTE
SW received order for community palliative care; arranged w/ [facility] per order request. Pt has been screened per chart review and during nursing rounds. Pt is from [facility]. Pt's daughter is involved w/ pt's care.  SW made referral t

## 2019-01-22 NOTE — PLAN OF CARE
Patient/Family Goals    • Patient/Family Long Term Goal Progressing    • Patient/Family Short Term Goal Progressing          Patient with no complaints of pain overnight. Incontinence care provided. R Fentanyl Patch In place. No calls from tele.  Ancef admi

## 2019-01-22 NOTE — PROGRESS NOTES
[address]    Progress Note    Luis Mikervin Patient Status:  Inpatient    3/21/1931 MRN [id number]   Location [facility] 4W/SW/SE Attending Hood Shultz MD   Hosp Day # 1 PCP Izzy Sanz MD     Da continues to be an issue, especially in setting of newly found bladder mass  -continue lisinopril - hold if systolic less than 900    -STOP telemetry. CV status is stable and patient with significant dementia.   Tele box could cause confusion / more harm t deficits,  appropriate affect/insight      Results:     Medications:  Reviewed in Epic    Current Facility-Administered Medications:  Piperacillin Sod-Tazobactam So (ZOSYN) 3.375 g in dextrose 5 % 100 mL ADD-vantage 3.375 g Intravenous Q8H   acetaminophen 01/22/19   0514   RBC  3.91*  3.66*   HGB  9.7*  9.2*   HCT  31.7*  29.9*   MCV  81.0  81.7   MCH  24.9*  25.3*   MCHC  30.7*  31.0*   RDW  17.0*  17.4*   WBC  14.6*  11.8*   PLT  166  164       Recent Labs   Lab  01/21/19   1249  01/22/19   0514   GLU  11

## 2019-01-23 NOTE — PROGRESS NOTES
[facility] AND [facility] Infectious Disease Progress Note    Blaise De Los Santos Patient Status:  Inpatient    3/21/1931 MRN [id number]   Location [facility] 4W/SW/SE Attending Farida Zaldivar MD   Hosp Day # 2 PCP Bernice Burt MD     Subjective:  Pt packet 17 g, 17 g, Oral, Daily PRN  •  magnesium hydroxide (MILK OF MAGNESIA) 400 MG/5ML suspension 30 mL, 30 mL, Oral, Daily PRN  •  bisacodyl (DULCOLAX) rectal suppository 10 mg, 10 mg, Rectal, Daily PRN  •  FLEET ENEMA (FLEET) 7-19 GM/118ML enema 133 mL have any questions or concerns please call [facility] at [phone].      DEON LIMA NP  1/23/2019  10:31 AM

## 2019-01-23 NOTE — SPIRITUAL CARE NOTE
Chp. Made several attempts to see this patient; Patient was sleeping every time. Chp. Left \"soory, I miss you\" note and asked to call if the Patient or his family would like a pastoral visit.  Chp marked the chart to continue visiting

## 2019-01-23 NOTE — PROGRESS NOTES
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     Imaging:  Xr Chest Ap Portable  (cpt=71045)    Result Date: 1/21/2019  PROCEDURE: XR CHEST AP PORTABLE (CPT=71010) TI up to 1.9 cm significantly increased in size since the 2014 exam. Apparent water density prominence appears to represent a parapelvic cyst on delayed imaging. Additional 2 mm calculi are seen in the lower pole calyx of the right.  No abnormal calculi are se stable since 2014. The right adrenal gland is normal in size and shape without focal lesions. ABDOMINAL AORTA: The aorta is normal in course.  Calcified and noncalcified atherosclerosis is seen diffusely extending into the iliac arteries and just below the

## 2019-01-23 NOTE — PROGRESS NOTES
[facility] HOSP - [facility]    Progress Note    Paty Mann Patient Status:  Inpatient    3/21/1931 MRN [id number]   Location [facility] 4W/SW/SE Attending Justyna De La O MD   Hosp Day # 2 PCP Martha Chakraborty MD     Subjective:  Paty Mann i

## 2019-01-23 NOTE — PLAN OF CARE
Patient remains confused. He is unable to tell me where he is and why he is here. Per daughter, this is not his usual self and he is usually alert and oriented x3. IV Zosyn continued and ID put on consult for IV ABT management. Patient remains incontinent.

## 2019-01-23 NOTE — PLAN OF CARE
Patient/Family Goals    • Patient/Family Long Term Goal Progressing    • Patient/Family Short Term Goal Progressing          Kody Sharpe is resting comfortable. Patient remains confused. Zosyn administered. VSS. No calls from tele. Incontinence care provided.  Bed

## 2019-01-23 NOTE — PROGRESS NOTES
[facility]  Gastroenterology Progress Note    Cinthya Guerra Patient Status:  Inpatient    3/21/1931 MRN [id number]   Location [facility] 4W/SW/SE Attending Darnell Granda MD   Hosp Day # 1 PCP Snow Bradford MD     Subjective:  Favian Thomas artery disease) ([facility] [address]     [facility] with diabetes [facility]     Coronary artery disease involving coronary bypass graft of native heart without angina pectoris     Late onset Alzheimer's disease without behavioral disturbance     Vitamin D defici

## 2019-01-23 NOTE — PROGRESS NOTES
[place] [facility] H[facility]P - [facility]  Palliative Care Follow Up    Ezequiel Bis Patient Status:  Inpatient    3/21/1931 MRN [id number]   Location [facility] 4W/SW/SE Attending Brittani Martinez MD   [facility] Day # 2 PCP Brynn Islas MD     Date of Consult: 1 Truett Osgood notes the lengthy discussions she had with her father in the past about his end of life wishes and she tells me that she feels conflicted between a duty to uphold his wishes and also to respect her dtr's input.   Truett Osgood mentioned her father's statement (PROTONIX) EC tab 40 mg, 40 mg, Oral, QAM AC  •  Albuterol Sulfate  (90 Base) MCG/ACT inhaler 2 puff, 2 puff, Inhalation, Q4H PRN  •  TraZODone HCl (DESYREL) tab 50 mg, 50 mg, Oral, Nightly  •  Alfuzosin HCl ER (UROXATRAL) 24 hr tab 10 mg, 10 mg, Or Date: 1/21/2019  CONCLUSION:  1. Mild cardiomegaly and normal pulmonary vascularity. Prominent pulmonary markings appear to be chronic. No acute airspace disease.      Dictated by (CST): Linda Whalen MD on 1/21/2019 at 13:30     Approved by (CST): Dwayne Roth antibiotics    Bladder stone / bladder mass suspicious malignancy  - per primary/urology  - dtr prefers no surgery, conservative managment    Heme positive stool/anemia  -per GI, PPI, monitor H&H  -dtr prefers conservative management with no scopes    Alzh

## 2019-01-24 NOTE — PLAN OF CARE
Patient/Family Goals    • Patient/Family Long Term Goal Progressing    • Patient/Family Short Term Goal Progressing          Plan of care reviewed with patient and daughter/ grand daughter in law. Patient unable to comprehend care. IV ABT maintained.  Janes

## 2019-01-24 NOTE — PLAN OF CARE
Confused throughout night, answers either \"no\" or \"uh huh\". Incontinent of urine and stool, pericare provided. Bed bath given overnight. Assisted with eating dinner. 2L O2 via nasal cannula initiated, able to wean off overnight. Zosyn as scheduled.  Leonel

## 2019-01-24 NOTE — PROGRESS NOTES
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 Active social life, still ambulatory.   Would not want to do any procedures unless it would make him feel b Transdermal Q72H   • Pantoprazole Sodium  40 mg Oral QAM AC   • TraZODone HCl  50 mg Oral Nightly   • Alfuzosin HCl ER  10 mg Oral Daily with breakfast   • Umeclidinium Bromide  1 puff Inhalation Daily   • lisinopril  20 mg Oral Daily   • docusate sodium 1. Mild cardiomegaly and normal pulmonary vascularity. Prominent pulmonary markings appear to be chronic. No acute airspace disease.      Dictated by (CST): Oj Dahl MD on 1/21/2019 at 13:30     Approved by (CST): Oj Dahl MD on 1/21/2019 at 13: right kidney stable since the prior.  A mild area of relative hyperdensity in the posterior lateral left kidney in the mid to lower pole demonstrates no enhancement stable since the previous measuring up to 6 mm consistent with a proteinaceous cyst. 1.8 cm demonstrates resolved pleural effusions. Diffuse prominence of the visualized heart is seen without pericardial effusion. IMPRESSION: 1.  Nephrolithiasis as detailed above with the largest seen on the right increased since the 2014 exam. Additional stone

## 2019-01-24 NOTE — PROGRESS NOTES
Vinod Hassan is a 80year old male. Patient presents with:  Fatigue (constitutional, neurologic)      HPI:    Asleep arousable;appetite seems ok    REVIEW OF SYSTEMS:   A comprehensive 11 point review of systems was completed.   Pertinent positives and n SURGICAL HISTORY  7/2/14    Cysto-Stent Removal-Dr Kaitlin Farrell   • SPINAL FUSION,ANT,EA ADNL LEVEL            Social history and family history negative related to present illness except as above.     PHYSICAL EXAM:   /65 (BP Location: Right arm)   Pulse 86 encounter of 01/21/19   URINALYSIS WITH CULTURE REFLEX   Result Value Ref Range    Urine Color Yellow Yellow    Clarity Urine Cloudy (A) Clear    Spec Gravity 1.021 1.002 - 1.035    pH Urine 5.0 5.0 - 8.0    Protein Urine 100  (A) Negative mg/dL    Glucose 144 mmol/L    Potassium 3.8 3.3 - 5.1 mmol/L    Chloride 97 95 - 110 mmol/L    CO2 25 22 - 32 mmol/L    BUN 15 8 - 20 mg/dL    Creatinine 0.82 0.50 - 1.50 mg/dL    Calcium, Total 10.1 8.5 - 10.5 mg/dL    BUN/CREA Ratio 18.3 10.0 - 20.0    Anion Gap 11 0 - Levofloxacin >=8 Resistant      Nitrofurantoin <=16 Sensitive      Piperacillin + Tazobactam <=4 Sensitive      Trimethoprim/Sulfa <=20 Sensitive    BLOOD CULTURE   Result Value Ref Range    Blood Culture Result Escherichia coli (A)     Blood Smear Positiv 37.0 g/dl    RDW 17.4 (H) 11.0 - 15.0 %     140 - 400 K/UL    MPV 9.0 7.4 - 10.3 fL    Neutrophil % 87 %    Lymphocyte % 5 %    Monocyte % 7 %    Eosinophil % 0 %    Basophil % 0 %    Neutrophil Absolute 10.3 (H) 1.8 - 7.7 K/UL    Lymphocyte Absolut

## 2019-01-24 NOTE — PROGRESS NOTES
[facility]    Progress Note    Rosa Buck Patient Status:  Inpatient    3/21/1931 MRN [id number]   Location [facility] 4W/SW/SE Attending Dayron Liang MD   Hosp Day # 3 PCP Lucas Lopez MD     Subjective:  Rosa Buck i

## 2019-01-24 NOTE — CM/SW NOTE
CTL note:  CTL contacted pt's daughter Ester Kennedy regarding her father's eligibility for [facility] enrollment in [place] [id number] under . Informed her that I left the [employer] Brochure for her on the desk in her father's room.

## 2019-01-24 NOTE — PROGRESS NOTES
[place] [facility] - [facility]  Palliative Care Follow Up    Mel Peña Patient Status:  Inpatient    3/21/1931 MRN [id number]   Location [facility] 4W/SW/SE Attending Efrain Cruz MD   [facility] Day # 3 PCP Sada Dao MD     Date of Consult: 1 mg, 90 mg, Oral, Daily  •  fentaNYL (DURAGESIC) 12 MCG/HR 1 patch, 1 patch, Transdermal, Q72H  •  HYDROcodone-acetaminophen (NORCO) 5-325 MG per tab 1 tablet, 1 tablet, Oral, Q6H PRN  •  Pantoprazole Sodium (PROTONIX) EC tab 40 mg, 40 mg, Oral, QAM AC  • 11/27/2018    BILT 0.37 11/27/2018    TP 7.9 11/27/2018    AST 27 11/27/2018    ALT 28 11/27/2018    PSA 2.9 06/18/2010    MG 1.9 01/24/2019       Imaging:        Objective:  Vital Signs:  Blood pressure 143/65, pulse 86, temperature 97.8 °F (36.6 °C), tem managment    Heme positive stool/anemia  -per GI, PPI, monitor H&H  -dtr prefers conservative management with no scopes    Alzheimer's dementia  -progressing     Afib    COPD    CAD s/p CABG    PVD s/p AAA repair    PreDM    DDD spinal stenosis with chroni

## 2019-01-25 NOTE — PLAN OF CARE
Problem: Patient/Family Goals  Goal: Patient/Family Short Term Goal  Patient's Short Term Goal: Infection controlled    Interventions:   - IV ABT   - See additional Care Plan goals for specific interventions   Outcome: Progressing      Problem: RISK FOR IN

## 2019-01-25 NOTE — PROGRESS NOTES
[facility]  Palliative Care Follow Up    Luis Wiley Patient Status:  Inpatient    3/21/1931 MRN [id number]   Location [facility] 4W/SW/SE Attending Mono De Dios MD   [address] Day # 4 PCP Izzy Sanz MD     Date of Consult: 19 Oral, Nightly  •  Alfuzosin HCl ER (UROXATRAL) 24 hr tab 10 mg, 10 mg, Oral, Daily with breakfast  •  LORazepam (ATIVAN) tab 1 mg, 1 mg, Oral, BID PRN  •  Umeclidinium Bromide (INCRUSE ELLIPTA) 62.5 MCG/INH inhaler 1 puff, 1 puff, Inhalation, Daily  •  lis kg/m². Present Level of pain: 0  Non-verbal signs of pain present: NO    Physical Exam:  General: Alert and in no apparent distress. Weak, chronically-ill appearing  HEENT: No focal deficits.   Cardiac: Irregular rate and rhythm, S1, S2 normal, no murmur, DNR/DNI, no feeding tubes and continue conservative supportive care. - Per RN, possible plan for DC to [facility] today with [address] care with possible bridge to hospice care if he continues to decline.  NOT ready for hospice dipesh

## 2019-01-25 NOTE — PROGRESS NOTES
[facility] Infectious Disease Progress Note    Rosa Buck Patient Status:  Inpatient    3/21/1931 MRN [id number]   Location [facility] 4W/SW/SE Attending Dayron Liang MD   Hosp Day # 4 PCP Lucas Lopez MD     Subjective:  Pt (MIRALAX) powder packet 17 g, 17 g, Oral, Daily PRN  •  magnesium hydroxide (MILK OF MAGNESIA) 400 MG/5ML suspension 30 mL, 30 mL, Oral, Daily PRN  •  bisacodyl (DULCOLAX) rectal suppository 10 mg, 10 mg, Rectal, Daily PRN  •  FLEET ENEMA (FLEET) 7-19 GM/1 questions or concerns please call [facility]-ID at [phone].      DEON LIMA NP  1/23/2019  10:31 AM

## 2019-01-25 NOTE — DISCHARGE SUMMARY
General Medicine Discharge Summary     Patient ID:  Tutu Robb  80year old  3/21/1931    Admit date: 1/21/2019    Discharge date and time: 1/25/2019  2:55 PM      Attending Physician: No att. providers found     Consults: IP CONSULT TO HOSPITALIST  IP rapidly. No obvious bleeding aside from bloody urine that was seen a couple weeks ago.    They saw urology and were monitoring bladder mass.        Daughter and patient had good interaction with palliative medicine here in 2016 and patient was on hospice a family, given hgb stable, hematuria stopped would like to try resuming eliquis and would consider stopping if major bleeding issue. Since quality is the focus, if a cva were to occur it would negatively impact QOL.   He is ambulatory at baseline and only s fentaNYL 12 MCG/HR Pt72  Commonly known as:  Piilostentie 53 1 patch onto the skin every third day.      INCRUSE ELLIPTA 62.5 MCG/INH Aepb  Generic drug:  Umeclidinium Bromide  INHALE 1 PUFF BY MOUTH ONCE DAILY (SAME TIME EACH DAY)     lisinopril 20 MG T

## 2019-01-25 NOTE — CM/SW NOTE
The pt. Is scheduled to return to [facility] today 1/25 at 230p, via ambulance due to being impulsive and no safe to sit alone. JACINDA contacted [facility] and paper faxed medical information to the nurse. The pt's dtr.  Is also aware of discharge for today

## (undated) NOTE — IP AVS SNAPSHOT
[facility]            (For Outpatient Use Only) Initial Admit Date: 1/21/2019   Inpt/Obs Admit Date: Inpt: 1/21/19 / Obs: N/A   Discharge Date:    Angelique Bronson:  [de-identified]   MRN: [de-identified]   CSN: [id number]        ENCOUNTER  Patient Class Subscriber ID:  Pt Rel to Subscriber:    Hospital Account Financial Class: Medicare    January 25, 2019

## (undated) NOTE — IP AVS SNAPSHOT
Patient Demographics     Address  [address] [place] [address]  Petra Cantrell [address] Phone  [phone] [facility]) *Preferred*  [phone] (Work)  [phone] [facility]) E-mail Address  [email or web address]      Emergency Contact(s)     Name Relation H Take 1 tablet (625 mg total) by mouth daily. Paddy Campos MD         Cefuroxime Axetil 500 MG Tabs  Commonly known as:  CEFTIN  Next dose due: Tonight      Take 1 tablet (500 mg total) by mouth 2 (two) times daily for 14 days.   Stop taking on:  2/8/20 Take 2 tablets (100 mg total) by mouth nightly.    Donn Jeff MD               Where to Get Your Medications      Please  your prescriptions at the location directed by your doctor or nurse    Bring a paper prescription for each of these MAIN LINE [facility] Patient Weight  68 kg (149 lb 14.6 oz)         Lab Results Last 24 Hours      POTASSIUM [[id number]] (Normal)  Resulted: 01/25/19 7628, Result status: Final result   Ordering provider:  Jonas Garza MD  01/24/19 2300 Resulting lab:  [facility]    Spe ---------                               -----------         ------                     CBC W/ DIFFERENTIAL[[id number]]          Abnormal            Final result                 Please view results for these tests on the individual orders.     Specimen Inform Meropenem <=0.25  Sensitive    Piperacillin + Tazobactam <=4  Sensitive    Trimethoprim/Sulfa <=20  Sensitive                   Urine Culture, Routine Once [[id number]]  (Abnormal)  (Susceptibility) Collected:  01/21/19 1249    Order Status:  Completed Lab Came to ER with daughter for weakness and hematuria.   Recently diagnosed bladder stone and tumor and has been seeing urology with plans for possible intervention    Stool in ER was heme +    hgb is down to 9.7 from 11.6 in November (maybe more as patient c • LUMBAR FACET INJECTION OR MEDIAL BRANCH NERVE BLOCK Bilateral 1/10/2013    Performed by Abby Rivero MD at [address] N/A 12/20/2012    Performed by Jameson Bruner MD at  WBC 14.6 (H) 01/21/2019    HGB 9.7 (L) 01/21/2019    HCT 31.7 (L) 01/21/2019     01/21/2019    CREATSERUM 0.92 01/21/2019    BUN 18 01/21/2019     01/21/2019    K 4.0 01/21/2019     01/21/2019    CO2 23 01/21/2019     (H) 01/21/2 2. 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 3. 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  4. Prostatomegaly.   5. Stable -continue lisinopril - hold if systolic less than 052    #neuropsych  -hold home supplements for dementia.   Daughter ok with this  -continue trazodone 50 mg nightly and cymbalta 90 mg day  -continue home dose prn ativan  -low dose haldol 0.5 mg PO (or IM i Date of Admission:  1/21/2019  Date of Consult:  1/22/2019    Reason for Consultation:  Sepsis with bacteremia    History of Present Illness:  Vinod Hassan is a a(n) 80year old male being seen at your request regarding sepsis with bacteremia.   Patient Performed by Fabi Chandler MD at [address] N/A 12/20/2012    Performed by Quique Hinton MD at . [facility] 139 N/A 4/24/2013    Perf •  lisinopril (PRINIVIL,ZESTRIL) tab 20 mg, 20 mg, Oral, Daily  •  Normal Saline Flush 0.9 % injection 3 mL, 3 mL, Intravenous, PRN  •  ondansetron HCl (ZOFRAN) injection 4 mg, 4 mg, Intravenous, Q6H PRN  •  Metoclopramide HCl (REGLAN) injection 10 mg, 10 01/21/19 2034 140/82 97.9 °F (36.6 °C) Oral 55 16 97 %   01/21/19 1900 — — — 98 — —   01/21/19 1824 — — — 94 — —       Intake/Output:  I/O this shift:  In: -   Out: 125 [Urine:125]    Physical Exam:   General: Awake, alert, non-tox and in NAD.    Head: Norm 1.  Acute gram negative sepsis - 2/2 blood cultures positive for GNRs - suspect e.coli from  source  - Urine cultures positive for e.coli - susceptibility pending  - IV ceftriaxone started    2.   Acute e.coli UTI/cystitis with known recent diagnosis of a Pneumovax 23 11/04/93     TDAP 11/08/11     Zoster Vaccine Live (Zostavax) 08/31/12

## (undated) NOTE — ED AVS SNAPSHOT
Blaise De Los Santos   MRN: [id number]    Department:  [facility] Emergency Department   Date of Visit:  1/1/2019           Disclosure     Insurance plans vary and the physician(s) referred by the ER may not be covered by your plan.  Please contact you within the next three months to obtain basic health screening including reassessment of your blood pressure.     IF THERE IS ANY CHANGE OR WORSENING OF YOUR CONDITION, CALL YOUR PRIMARY CARE PHYSICIAN AT ONCE OR RETURN IMMEDIATELY TO THE EMERGENCY DEPARTMEN